# Patient Record
Sex: FEMALE | Race: BLACK OR AFRICAN AMERICAN | NOT HISPANIC OR LATINO | Employment: UNEMPLOYED | URBAN - METROPOLITAN AREA
[De-identification: names, ages, dates, MRNs, and addresses within clinical notes are randomized per-mention and may not be internally consistent; named-entity substitution may affect disease eponyms.]

---

## 2022-01-12 ENCOUNTER — SOCIAL WORK (OUTPATIENT)
Dept: ADMINISTRATIVE | Facility: OTHER | Age: 37
End: 2022-01-12
Payer: MEDICAID

## 2022-01-12 NOTE — PROGRESS NOTES
SW met with pt regarding initial OB assessment. Pt stated this is her 3rd pregnancy/0-miscarriage. Pt stated lives with her /children-11,4 and able to perform ADL's independently. Pt stated does not work. Pt stated support system is her /[employer]. Pt stated has medicaid([employer]). Pt stated does not have WIC. Pt stated not going to breastfeed. SW provide pt with information on other community resources.SW faxed and scanned pt's notification of pregnancy into epic.  No other needs identified at this time.    Mary Goetz,MSW  Pager#[phone]

## 2022-01-24 ENCOUNTER — PATIENT MESSAGE (OUTPATIENT)
Dept: ADMINISTRATIVE | Facility: OTHER | Age: 37
End: 2022-01-24
Payer: MEDICAID

## 2022-01-28 PROBLEM — O09.521 MULTIGRAVIDA OF ADVANCED MATERNAL AGE IN FIRST TRIMESTER: Status: ACTIVE | Noted: 2022-01-28

## 2022-01-28 PROBLEM — O09.91 SUPERVISION OF HIGH RISK PREGNANCY IN FIRST TRIMESTER: Status: ACTIVE | Noted: 2022-01-28

## 2022-01-28 PROBLEM — Z98.891 H/O CESAREAN SECTION: Status: ACTIVE | Noted: 2022-01-28

## 2022-01-28 PROBLEM — Z87.59 H/O PLACENTA PREVIA: Status: ACTIVE | Noted: 2022-01-28

## 2022-02-25 PROBLEM — A59.9 TRICHOMONAS INFECTION: Status: ACTIVE | Noted: 2022-02-25

## 2022-03-15 PROBLEM — O26.899 ABDOMINAL PAIN AFFECTING PREGNANCY: Status: ACTIVE | Noted: 2022-03-15

## 2022-03-15 PROBLEM — R10.9 ABDOMINAL PAIN AFFECTING PREGNANCY: Status: ACTIVE | Noted: 2022-03-15

## 2022-03-23 PROBLEM — O09.522 MULTIGRAVIDA OF ADVANCED MATERNAL AGE IN SECOND TRIMESTER: Status: ACTIVE | Noted: 2022-01-28

## 2022-04-25 ENCOUNTER — PATIENT MESSAGE (OUTPATIENT)
Dept: ADMINISTRATIVE | Facility: OTHER | Age: 37
End: 2022-04-25
Payer: MEDICAID

## 2022-07-30 PROBLEM — Z98.891 STATUS POST REPEAT LOW TRANSVERSE CESAREAN SECTION: Status: ACTIVE | Noted: 2022-07-30

## 2022-07-31 PROBLEM — O09.522 MULTIGRAVIDA OF ADVANCED MATERNAL AGE IN SECOND TRIMESTER: Status: RESOLVED | Noted: 2022-01-28 | Resolved: 2022-07-31

## 2022-08-03 PROBLEM — O14.10 SEVERE PREECLAMPSIA: Status: ACTIVE | Noted: 2022-08-03

## 2022-08-03 PROBLEM — R03.0 ELEVATED BLOOD PRESSURE READING: Status: ACTIVE | Noted: 2022-08-03

## 2022-08-04 PROBLEM — O28.8 EQUIVOCAL NON-STRESS TEST: Status: ACTIVE | Noted: 2022-08-04

## 2022-08-04 PROBLEM — O28.8 EQUIVOCAL NON-STRESS TEST: Status: RESOLVED | Noted: 2022-08-04 | Resolved: 2022-08-04

## 2022-08-07 PROBLEM — R60.0 EDEMA OF RIGHT LOWER EXTREMITY: Status: ACTIVE | Noted: 2022-08-07

## 2022-08-08 PROBLEM — O09.91 SUPERVISION OF HIGH RISK PREGNANCY IN FIRST TRIMESTER: Status: RESOLVED | Noted: 2022-01-28 | Resolved: 2022-08-08

## 2022-09-19 PROBLEM — Z90.49 S/P LAPAROSCOPIC CHOLECYSTECTOMY: Status: ACTIVE | Noted: 2020-08-26

## 2022-09-19 PROBLEM — K81.0 ACUTE CHOLECYSTITIS: Status: ACTIVE | Noted: 2020-08-17

## 2022-09-19 PROBLEM — O44.03 PLACENTA PREVIA IN THIRD TRIMESTER: Status: ACTIVE | Noted: 2017-05-09

## 2023-05-03 PROBLEM — Z98.891 STATUS POST REPEAT LOW TRANSVERSE CESAREAN SECTION: Status: RESOLVED | Noted: 2022-07-30 | Resolved: 2023-05-03

## 2023-05-03 PROBLEM — O26.899 ABDOMINAL PAIN AFFECTING PREGNANCY: Status: RESOLVED | Noted: 2022-03-15 | Resolved: 2023-05-03

## 2023-05-03 PROBLEM — R60.0 EDEMA OF RIGHT LOWER EXTREMITY: Status: RESOLVED | Noted: 2022-08-07 | Resolved: 2023-05-03

## 2023-05-03 PROBLEM — R03.0 ELEVATED BLOOD PRESSURE READING: Status: RESOLVED | Noted: 2022-08-03 | Resolved: 2023-05-03

## 2023-05-03 PROBLEM — O44.03 PLACENTA PREVIA IN THIRD TRIMESTER: Status: RESOLVED | Noted: 2017-05-09 | Resolved: 2023-05-03

## 2023-05-03 PROBLEM — R10.9 ABDOMINAL PAIN AFFECTING PREGNANCY: Status: RESOLVED | Noted: 2022-03-15 | Resolved: 2023-05-03

## 2023-06-13 ENCOUNTER — SOCIAL WORK (OUTPATIENT)
Dept: ADMINISTRATIVE | Facility: OTHER | Age: 38
End: 2023-06-13

## 2023-06-13 NOTE — PROGRESS NOTES
SW met with pt regarding initial OB assessment. Pt stated this is her 4th pregnancy/0-miscarriage. Pt stated lives with her /children-12,5,11 months and able to perform ADL's independently. Pt stated does work. Pt stated support system is her /[employer]. Pt stated has medicaid([employer]). Pt stated does not have WIC. Pt stated is going to breastfeed. SW provide pt with information on other community resources.SW faxed and scanned pt's notification of pregnancy into epic.  No other needs identified at this time.    Mary Goetz,MSW  Pager#[phone]

## 2023-09-18 PROBLEM — O09.92 SUPERVISION OF HIGH RISK PREGNANCY IN SECOND TRIMESTER: Status: ACTIVE | Noted: 2022-01-28

## 2023-09-18 PROBLEM — O99.810 ABNORMAL O'SULLIVAN GLUCOSE CHALLENGE TEST, ANTEPARTUM: Status: ACTIVE | Noted: 2023-09-18

## 2023-10-09 PROBLEM — O09.93 SUPERVISION OF HIGH RISK PREGNANCY IN THIRD TRIMESTER: Status: ACTIVE | Noted: 2022-01-28

## 2023-10-09 PROBLEM — O10.919 CHRONIC HYPERTENSION IN PREGNANCY: Status: ACTIVE | Noted: 2023-10-09

## 2023-10-09 PROBLEM — Z87.59 HISTORY OF PRE-ECLAMPSIA: Status: ACTIVE | Noted: 2023-10-09

## 2023-10-24 PROBLEM — A59.9 TRICHOMONAS INFECTION: Status: RESOLVED | Noted: 2022-02-25 | Resolved: 2023-10-24

## 2023-10-24 PROBLEM — K81.0 ACUTE CHOLECYSTITIS: Status: RESOLVED | Noted: 2020-08-17 | Resolved: 2023-10-24

## 2023-10-24 PROBLEM — Z30.09 UNWANTED FERTILITY: Status: ACTIVE | Noted: 2023-10-24

## 2023-11-08 PROBLEM — O09.293 HX OF PREECLAMPSIA, PRIOR PREGNANCY, CURRENTLY PREGNANT, THIRD TRIMESTER: Status: ACTIVE | Noted: 2023-11-08

## 2023-11-08 PROBLEM — Z3A.32 32 WEEKS GESTATION OF PREGNANCY: Status: ACTIVE | Noted: 2023-11-08

## 2023-11-08 PROBLEM — O09.93 HIGH-RISK PREGNANCY IN THIRD TRIMESTER: Status: ACTIVE | Noted: 2023-11-08

## 2023-11-08 PROBLEM — H05.239 RETROBULBAR HEMATOMA: Status: ACTIVE | Noted: 2023-11-08

## 2023-11-08 PROBLEM — T74.91XA DOMESTIC ABUSE OF ADULT: Status: ACTIVE | Noted: 2023-11-08

## 2023-11-08 PROBLEM — O9A.213 TRAUMATIC INJURY DURING PREGNANCY IN THIRD TRIMESTER: Status: ACTIVE | Noted: 2023-11-08

## 2023-11-08 PROBLEM — O09.523 MULTIGRAVIDA OF ADVANCED MATERNAL AGE IN THIRD TRIMESTER: Status: ACTIVE | Noted: 2022-01-28
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
## 2023-11-08 PROBLEM — R60.0 PERIORBITAL EDEMA OF BOTH EYES: Status: ACTIVE | Noted: 2023-11-08

## 2023-11-09 PROBLEM — E87.6 HYPOKALEMIA: Status: ACTIVE | Noted: 2023-11-09

## 2023-11-10 ENCOUNTER — SOCIAL WORK (OUTPATIENT)
Dept: ADMINISTRATIVE | Facility: OTHER | Age: 38
End: 2023-11-10
Payer: MEDICAID
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
## 2023-11-21 PROBLEM — Z3A.32 32 WEEKS GESTATION OF PREGNANCY: Status: RESOLVED | Noted: 2023-11-08 | Resolved: 2023-11-21

## 2023-11-25 PROBLEM — O47.9 UTERINE CONTRACTIONS DURING PREGNANCY: Status: RESOLVED | Noted: 2023-11-25 | Resolved: 2023-11-25

## 2023-11-25 PROBLEM — O47.9 UTERINE CONTRACTIONS DURING PREGNANCY: Status: ACTIVE | Noted: 2023-11-25

## 2023-11-27 PROBLEM — O47.9 UTERINE CONTRACTIONS DURING PREGNANCY: Status: RESOLVED | Noted: 2023-11-25 | Resolved: 2023-11-27

## 2023-11-27 PROBLEM — O09.293 HX OF PREECLAMPSIA, PRIOR PREGNANCY, CURRENTLY PREGNANT, THIRD TRIMESTER: Status: RESOLVED | Noted: 2023-11-08 | Resolved: 2023-11-27

## 2023-11-27 PROBLEM — Z98.51 STATUS POST TUBAL LIGATION: Status: ACTIVE | Noted: 2023-11-27

## 2023-11-27 PROBLEM — O09.93 HIGH-RISK PREGNANCY IN THIRD TRIMESTER: Status: RESOLVED | Noted: 2023-11-08 | Resolved: 2023-11-27

## 2023-11-27 PROBLEM — Z98.891 H/O CESAREAN SECTION: Status: RESOLVED | Noted: 2022-01-28 | Resolved: 2023-11-27

## 2023-11-27 PROBLEM — O09.93 SUPERVISION OF HIGH RISK PREGNANCY IN THIRD TRIMESTER: Status: RESOLVED | Noted: 2022-01-28 | Resolved: 2023-11-27

## 2023-11-29 PROBLEM — I10 CHRONIC HYPERTENSION: Status: ACTIVE | Noted: 2023-11-29

## 2023-12-15 PROBLEM — Z48.89 ENCOUNTER FOR POSTOPERATIVE WOUND CHECK: Status: ACTIVE | Noted: 2023-12-15

## 2024-01-19 PROBLEM — F41.9 ANXIETY: Chronic | Status: ACTIVE | Noted: 2024-01-19
